# Patient Record
Sex: FEMALE | Race: WHITE | NOT HISPANIC OR LATINO | Employment: FULL TIME | URBAN - METROPOLITAN AREA
[De-identification: names, ages, dates, MRNs, and addresses within clinical notes are randomized per-mention and may not be internally consistent; named-entity substitution may affect disease eponyms.]

---

## 2024-01-12 ENCOUNTER — OFFICE VISIT (OUTPATIENT)
Dept: PRIMARY CARE | Facility: CLINIC | Age: 49
End: 2024-01-12
Payer: COMMERCIAL

## 2024-01-12 VITALS
WEIGHT: 176.6 LBS | OXYGEN SATURATION: 98 % | BODY MASS INDEX: 28.38 KG/M2 | HEIGHT: 66 IN | HEART RATE: 88 BPM | DIASTOLIC BLOOD PRESSURE: 78 MMHG | SYSTOLIC BLOOD PRESSURE: 122 MMHG

## 2024-01-12 DIAGNOSIS — Z00.00 ANNUAL PHYSICAL EXAM: Primary | ICD-10-CM

## 2024-01-12 DIAGNOSIS — Z12.11 SCREEN FOR COLON CANCER: ICD-10-CM

## 2024-01-12 DIAGNOSIS — Z79.899 DRUG THERAPY: ICD-10-CM

## 2024-01-12 DIAGNOSIS — Z12.31 OTHER SCREENING MAMMOGRAM: ICD-10-CM

## 2024-01-12 DIAGNOSIS — F90.9 ATTENTION DEFICIT HYPERACTIVITY DISORDER (ADHD), UNSPECIFIED ADHD TYPE: ICD-10-CM

## 2024-01-12 PROCEDURE — 1036F TOBACCO NON-USER: CPT | Performed by: STUDENT IN AN ORGANIZED HEALTH CARE EDUCATION/TRAINING PROGRAM

## 2024-01-12 PROCEDURE — 80324 DRUG SCREEN AMPHETAMINES 1/2: CPT

## 2024-01-12 PROCEDURE — 93000 ELECTROCARDIOGRAM COMPLETE: CPT | Performed by: STUDENT IN AN ORGANIZED HEALTH CARE EDUCATION/TRAINING PROGRAM

## 2024-01-12 PROCEDURE — 99386 PREV VISIT NEW AGE 40-64: CPT | Performed by: STUDENT IN AN ORGANIZED HEALTH CARE EDUCATION/TRAINING PROGRAM

## 2024-01-12 PROCEDURE — 99214 OFFICE O/P EST MOD 30 MIN: CPT | Performed by: STUDENT IN AN ORGANIZED HEALTH CARE EDUCATION/TRAINING PROGRAM

## 2024-01-12 RX ORDER — SERTRALINE HYDROCHLORIDE 100 MG/1
100 TABLET, FILM COATED ORAL
Qty: 30 TABLET | Refills: 24 | Status: CANCELLED | OUTPATIENT
Start: 2024-01-12 | End: 2026-01-21

## 2024-01-12 RX ORDER — SERTRALINE HYDROCHLORIDE 100 MG/1
100 TABLET, FILM COATED ORAL
COMMUNITY
Start: 2022-03-22 | End: 2024-03-31

## 2024-01-12 RX ORDER — DEXTROAMPHETAMINE SACCHARATE, AMPHETAMINE ASPARTATE MONOHYDRATE, DEXTROAMPHETAMINE SULFATE AND AMPHETAMINE SULFATE 7.5; 7.5; 7.5; 7.5 MG/1; MG/1; MG/1; MG/1
30 CAPSULE, EXTENDED RELEASE ORAL
Qty: 30 CAPSULE | Refills: 13 | Status: CANCELLED | OUTPATIENT
Start: 2024-01-12 | End: 2025-02-10

## 2024-01-12 RX ORDER — DEXTROAMPHETAMINE SACCHARATE, AMPHETAMINE ASPARTATE MONOHYDRATE, DEXTROAMPHETAMINE SULFATE AND AMPHETAMINE SULFATE 7.5; 7.5; 7.5; 7.5 MG/1; MG/1; MG/1; MG/1
30 CAPSULE, EXTENDED RELEASE ORAL
COMMUNITY
Start: 2024-01-09 | End: 2024-01-19 | Stop reason: SDUPTHER

## 2024-01-12 ASSESSMENT — PAIN SCALES - GENERAL: PAINLEVEL: 0-NO PAIN

## 2024-01-12 NOTE — PROGRESS NOTES
Subjective   Patient ID: Ashwini Larose is a 49 y.o. female who presents for Establish Care and Annual Exam (PAP 2021). Moved here from [place] about 2 years ago. Had continued to see PCP in [place] until just recently, has decided to establish care closer to home. Last visit around 8/2023    INTERVAL HX/CURRENT CONCERNS:  Was seen in ED 1/03/24 for pelvic pain. Was diagnosed with fibroids. Saw gynecology earlier this week and had EMB and awaiting results. Will be likely planning for hysterectomy which she is a bit nervous about.     CHRONIC CONDITIONS:  ADHD - states taking Adderall since about 2020. Previously through [place] PCP, Dr. Yolis Avina at [facility]. Diagnosed years ago by there PCP, started on Adderall 2020. Has never seen psychiatry.. Last dose adjustment was about a year ago. Has been doing very well on this current dose. Last rx filled around 11/2023 for 90 pills. Taking 30 mg most days but usually not on weekends.   Anxiety/Depression - sertraline 100mg. Stable mood.    Chart review -  8/11/23- Telemedicine w/ Dr. Donahue in   per notes continue zoloft 100mg, consider increasing dose next visit. Topomax 50mg BID for weight loss. No notes regarding ADHD/adderall  5/19/23 - Telemedicine w/ Dr. Avina. Resumed adderall, temporarily changed to focalin d/t low supply at pharmacy. Dose 20-30mg daily based on symptoms. Buspar for anxiety. Topomax for weight. Gyn referral for dank-menopausal symptoms.     EtOH - occas 2 drinks/week  Drugs - none   Tobacco - former smoker, social in HS, never regular smoker    Stimulants:   What is the patient's goal of therapy? Daily functioning at work and at home  Is this being achieved with current treatment? Yes     Activities of Daily Living:   Is your overall impression that this patient is benefiting (symptom reduction outweighs side effects) from stimulant therapy? Yes     1. Physical Functioning: Better  2. Family Relationship: Better  3. Social Relationship: Better  4.  Mood: Same  5. Sleep Patterns: Same  6. Overall Function: Better    Health Maintenance  Immunizations:     Tdap - not UTD, decline today    Pneumococcal    Shingles     COVID - x2, no plans for updated vaccine    Influenza - declines    Colonoscopy: never. Agreeable to referral. 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 Erika Harvey   LMP 1/01/24  H/o STD denies  Last Pap - unsure, currently under gyn  Last Mammogram - 2020, ordered  Sexual partner - fiance    Current Outpatient Medications   Medication Instructions    amphetamine-dextroamphetamine XR (Adderall XR) 30 mg 24 hr capsule 30 mg, oral, Daily RT    sertraline (ZOLOFT) 100 mg, oral, Daily RT     No Known Allergies      There is no immunization history on file for this patient.  Past Surgical History:   Procedure Laterality Date    APPENDECTOMY      WISDOM TOOTH EXTRACTION       Family History   Problem Relation Name Age of Onset    Cancer Mother      Heart disease Mother      Hypertension Mother      Hypertension Father      Stroke Father      Asthma Brother      Cancer Maternal Grandmother       Social History     Tobacco Use    Smoking status: Former     Types: Cigarettes    Smokeless tobacco: Never   Vaping Use    Vaping Use: Never used   Substance Use Topics    Alcohol use: Yes    Drug use: Never       Review of Systems   Constitutional:  Negative for chills and fever.   HENT:  Negative for trouble swallowing.    Eyes:  Negative for visual disturbance.   Respiratory:  Negative for cough, chest tightness, shortness of breath and wheezing.    Cardiovascular:  Negative for chest pain and palpitations.   Gastrointestinal:  Negative for abdominal pain, blood in stool, constipation and diarrhea.   Genitourinary:  Negative for difficulty urinating, vaginal bleeding, vaginal discharge and vaginal  "pain.   Neurological:  Negative for dizziness, weakness, light-headedness and headaches.       Objective   Visit Vitals  /78   Pulse 88   Ht 1.676 m (5' 6\")   Wt 80.1 kg (176 lb 9.6 oz)   LMP 01/01/2024   SpO2 98%   BMI 28.50 kg/m²   Smoking Status Former   BSA 1.93 m²       Physical Exam  Constitutional:       Appearance: Normal appearance.   HENT:      Head: Normocephalic and atraumatic.      Right Ear: Tympanic membrane, ear canal and external ear normal.      Left Ear: Tympanic membrane, ear canal and external ear normal.      Mouth/Throat:      Mouth: Mucous membranes are moist.      Pharynx: Oropharynx is clear.   Eyes:      Extraocular Movements: Extraocular movements intact.      Conjunctiva/sclera: Conjunctivae normal.      Pupils: Pupils are equal, round, and reactive to light.   Cardiovascular:      Rate and Rhythm: Normal rate and regular rhythm.      Pulses: Normal pulses.      Heart sounds: Normal heart sounds. No murmur heard.  Pulmonary:      Effort: Pulmonary effort is normal.      Breath sounds: Normal breath sounds. No wheezing, rhonchi or rales.   Abdominal:      General: Abdomen is flat.      Palpations: Abdomen is soft.      Tenderness: There is no abdominal tenderness.   Musculoskeletal:         General: Normal range of motion.      Cervical back: Neck supple.   Skin:     General: Skin is warm and dry.   Neurological:      Mental Status: She is alert.   Psychiatric:         Mood and Affect: Mood normal.         Behavior: Behavior normal.           Assessment/Plan   1. Annual physical exam  Normal exam  No acute issues  HM reviewed and discussed  Labs done recently in ED, reviewed.   - ECG 12 Lead    2. Attention deficit hyperactivity disorder (ADHD), unspecified ADHD type  Treated through prior PCP. Discussed history of her diagnosis and treatment. Prior records briefly reviewed Urine collected today for UDS. Last dose of Adderall was today. She will be running out in next several days. " Well controlled on current regimen.  Controlled substance contract reviewed with pt and completed today  Dosing and appropriate use discussed.  Precautions discussed  No red flags identified but  unable to access PDMP during our visit so will need to review before refill her prescription. Also informed I will need to further review her prior records to confirm diagnosis history/testing/treatment history before prescribing any medication. She is agreeable.   Return 3 months  - Amphetamine Confirm, Urine; Future    3. Drug therapy  - Amphetamine Confirm, Urine; Future  - Amphetamine Confirm, Urine; Future  - Amphetamine Confirm, Urine    4. Other screening mammogram  - BI mammo bilateral screening tomosynthesis    5. Screen for colon cancer  - Referral to Gastroenterology; Future

## 2024-01-14 ASSESSMENT — ENCOUNTER SYMPTOMS
LIGHT-HEADEDNESS: 0
PALPITATIONS: 0
CHEST TIGHTNESS: 0
FEVER: 0
SHORTNESS OF BREATH: 0
COUGH: 0
WEAKNESS: 0
HEADACHES: 0
DIARRHEA: 0
WHEEZING: 0
ABDOMINAL PAIN: 0
CONSTIPATION: 0
CHILLS: 0
BLOOD IN STOOL: 0
DIZZINESS: 0
DIFFICULTY URINATING: 0
TROUBLE SWALLOWING: 0

## 2024-01-17 LAB
AMPHET UR-MCNC: >5000 NG/ML
MDA UR-MCNC: <200 NG/ML
MDEA UR-MCNC: <200 NG/ML
MDMA UR-MCNC: <200 NG/ML
METHAMPHET UR-MCNC: <200 NG/ML
PHENTERMINE UR CFM-MCNC: <200 NG/ML

## 2024-01-18 ENCOUNTER — CLINICAL SUPPORT (OUTPATIENT)
Dept: PRIMARY CARE | Facility: CLINIC | Age: 49
End: 2024-01-18
Payer: COMMERCIAL

## 2024-01-18 ENCOUNTER — TELEPHONE (OUTPATIENT)
Dept: PRIMARY CARE | Facility: CLINIC | Age: 49
End: 2024-01-18

## 2024-01-18 DIAGNOSIS — F90.9 ATTENTION DEFICIT HYPERACTIVITY DISORDER (ADHD), UNSPECIFIED ADHD TYPE: Primary | ICD-10-CM

## 2024-01-18 DIAGNOSIS — Z79.899 HISTORY OF LONG-TERM TREATMENT WITH HIGH-RISK MEDICATION: ICD-10-CM

## 2024-01-18 DIAGNOSIS — Z79.899 ENCOUNTER FOR LONG-TERM (CURRENT) USE OF HIGH-RISK MEDICATION: ICD-10-CM

## 2024-01-18 LAB
AMPHETAMINES UR QL SCN>1000 NG/ML: POSITIVE
BARBITURATES UR QL SCN>300 NG/ML: NEGATIVE
BENZODIAZ UR QL SCN>300 NG/ML: NEGATIVE
BZE UR QL SCN>300 NG/ML: NEGATIVE
CANNABINOIDS UR QL SCN>50 NG/ML: NEGATIVE
FENTANYL+NORFENTANYL UR QL SCN: NEGATIVE
METHADONE UR QL SCN>300 NG/ML: NEGATIVE
OPIATES UR QL SCN>300 NG/ML: NEGATIVE
OXYCODONE UR QL: NEGATIVE
PCP UR QL SCN>25 NG/ML: NEGATIVE

## 2024-01-18 PROCEDURE — 80324 DRUG SCREEN AMPHETAMINES 1/2: CPT

## 2024-01-18 PROCEDURE — 80307 DRUG TEST PRSMV CHEM ANLYZR: CPT

## 2024-01-18 NOTE — TELEPHONE ENCOUNTER
Please let her know that I received the results of her urine test from her visit last week and unfortunately the full testing that needed was not completed. It looks like part of the order somehow was cancelled. Will need to have her give us another urine sample at her convenience.     Please arrange for her to come in for nurse visit to give urine sample. Once scheduled I will place order.

## 2024-01-18 NOTE — TELEPHONE ENCOUNTER
Spoke to pt informed of need to come in for urine sample. Voiced understanding. Pt wondering if adderall will be able to be refilled, stating she only has about 3 days worth left, she is aware that [facility] was waiting for documentation from prior provider.

## 2024-01-18 NOTE — TELEPHONE ENCOUNTER
I need the results of the urine test before I can fill the medication. I was able to review the records from her previous provider so just need the results from the urine test. I do not see any diagnostic testing in her chart from what I have reviewed so I am going to put in a referral to psychiatry so that can be done to verify her diagnosis but as we discussed I am willing to fill the medication so her treatment is not interrupted while we wait for her to get in with psychiatry.

## 2024-01-19 RX ORDER — DEXTROAMPHETAMINE SACCHARATE, AMPHETAMINE ASPARTATE, DEXTROAMPHETAMINE SULFATE AND AMPHETAMINE SULFATE 2.5; 2.5; 2.5; 2.5 MG/1; MG/1; MG/1; MG/1
TABLET ORAL
Qty: 90 TABLET | Refills: 0 | Status: SHIPPED | OUTPATIENT
Start: 2024-01-19

## 2024-01-19 NOTE — TELEPHONE ENCOUNTER
Urine drug screen results received and reviewed. Results as expected. Unable to access pt PDMP report through Epic. With assistance from pharmacist Gema Redd was able to pull up report on PDMP website outside of EMR under pt's legal name Talya Larose. No concerning data, prescription history verified. Last filled 11/21/2023 for 90 pills. Report scanned to chart. Prescription sent to pharmacy, will fill prescription until gets in to see psychiatry, referral placed. Prescription sent. To follow up 3 months

## 2024-01-23 LAB
AMPHET UR-MCNC: 2261 NG/ML
MDA UR-MCNC: <200 NG/ML
MDEA UR-MCNC: <200 NG/ML
MDMA UR-MCNC: <200 NG/ML
METHAMPHET UR-MCNC: <200 NG/ML
PHENTERMINE UR CFM-MCNC: <200 NG/ML

## 2024-02-10 ENCOUNTER — HOSPITAL ENCOUNTER (OUTPATIENT)
Dept: RADIOLOGY | Facility: CLINIC | Age: 49
Discharge: HOME | End: 2024-02-10
Payer: COMMERCIAL

## 2024-02-10 VITALS — HEIGHT: 66 IN | BODY MASS INDEX: 26.52 KG/M2 | WEIGHT: 165 LBS

## 2024-02-10 PROCEDURE — 77067 SCR MAMMO BI INCL CAD: CPT

## 2024-02-16 ENCOUNTER — HOSPITAL ENCOUNTER (OUTPATIENT)
Dept: RADIOLOGY | Facility: EXTERNAL LOCATION | Age: 49
Discharge: HOME | End: 2024-02-16

## 2024-05-02 ENCOUNTER — HOSPITAL ENCOUNTER (OUTPATIENT)
Dept: RADIOLOGY | Facility: CLINIC | Age: 49
Discharge: HOME | End: 2024-05-02
Payer: COMMERCIAL

## 2024-05-02 ENCOUNTER — OFFICE VISIT (OUTPATIENT)
Dept: ORTHOPEDIC SURGERY | Facility: CLINIC | Age: 49
End: 2024-05-02
Payer: COMMERCIAL

## 2024-05-02 DIAGNOSIS — R22.32 FINGER MASS, LEFT: ICD-10-CM

## 2024-05-02 DIAGNOSIS — R22.32 FINGER MASS, LEFT: Primary | ICD-10-CM

## 2024-05-02 PROCEDURE — 99204 OFFICE O/P NEW MOD 45 MIN: CPT | Performed by: ORTHOPAEDIC SURGERY

## 2024-05-02 PROCEDURE — 1036F TOBACCO NON-USER: CPT | Performed by: ORTHOPAEDIC SURGERY

## 2024-05-02 PROCEDURE — 73140 X-RAY EXAM OF FINGER(S): CPT | Mod: LEFT SIDE | Performed by: RADIOLOGY

## 2024-05-02 PROCEDURE — 73140 X-RAY EXAM OF FINGER(S): CPT | Mod: LT

## 2024-05-02 PROCEDURE — 99214 OFFICE O/P EST MOD 30 MIN: CPT | Performed by: ORTHOPAEDIC SURGERY

## 2024-05-02 ASSESSMENT — PATIENT HEALTH QUESTIONNAIRE - PHQ9
SUM OF ALL RESPONSES TO PHQ9 QUESTIONS 1 AND 2: 0
1. LITTLE INTEREST OR PLEASURE IN DOING THINGS: NOT AT ALL
2. FEELING DOWN, DEPRESSED OR HOPELESS: NOT AT ALL

## 2024-05-02 ASSESSMENT — ENCOUNTER SYMPTOMS
DEPRESSION: 0
LOSS OF SENSATION IN FEET: 0
OCCASIONAL FEELINGS OF UNSTEADINESS: 0

## 2024-05-02 ASSESSMENT — PAIN - FUNCTIONAL ASSESSMENT: PAIN_FUNCTIONAL_ASSESSMENT: NO/DENIES PAIN

## 2024-05-04 NOTE — H&P (VIEW-ONLY)
History of Present Illness:  Chief Complaint   Patient presents with    Left Hand - Mass     Left ring finger mass       Patient presents today for evaluation of left ring finger mass that has been present for approximately 2 years.  It has been slowly increasing in size and causing nail deformity issues.  No associated pain.  She does not recall any prior injury.    Past Medical History:   Diagnosis Date    ADHD (attention deficit hyperactivity disorder)     Anxiety     Depression     Varicella 1982    Visual impairment 2018       Medication Documentation Review Audit       Reviewed by Isabela Kay CMA (Medical Assistant) on 24 at 1500      Medication Order Taking? Sig Documenting Provider Last Dose Status   amphetamine-dextroamphetamine (Adderall) 10 mg tablet [id number]  Take 2-3 tabs PO daily. 30 day prescription Sheryl Harvey MD  Active   sertraline (Zoloft) 100 mg tablet [id number]  Take 1 tablet (100 mg) by mouth once daily. Historical Provider, MD   24 1628                    No Known Allergies    Social History     Socioeconomic History    Marital status: Significant Other     Spouse name: Not on file    Number of children: Not on file    Years of education: Not on file    Highest education level: Not on file   Occupational History    Not on file   Tobacco Use    Smoking status: Former     Current packs/day: 0.00     Average packs/day: 0.3 packs/day for 3.0 years (0.8 ttl pk-yrs)     Types: Cigarettes     Start date: 1991     Quit date: 1994     Years since quittin.3    Smokeless tobacco: Never   Vaping Use    Vaping status: Never Used   Substance and Sexual Activity    Alcohol use: Yes     Alcohol/week: 3.0 standard drinks of alcohol     Types: 3 Glasses of wine per week    Drug use: Never    Sexual activity: Yes     Partners: Male     Birth control/protection: Condom Male   Other Topics Concern    Not on file   Social History Narrative    Not on file     Social  Determinants of Health     Financial Resource Strain: Not on file   Food Insecurity: No Food Insecurity (12/7/2020)    Received from [facility]    Hunger Vital Sign     Worried About Running Out of Food in the Last Year: Never true     Ran Out of Food in the Last Year: Never true   Transportation Needs: No Transportation Needs (12/7/2020)    Received from [facility]    PRAPARE - Transportation     Lack of Transportation (Medical): No     Lack of Transportation (Non-Medical): No   Physical Activity: Not on file   Stress: Not on file   Social Connections: Not on file   Intimate Partner Violence: Not on file   Housing Stability: Not on file       Past Surgical History:   Procedure Laterality Date    ADENOIDECTOMY  1986    APPENDECTOMY      COSMETIC SURGERY  Ears, 2017    WISDOM TOOTH EXTRACTION          Review of Systems   GENERAL: Negative for malaise, significant weight loss, fever  MUSCULOSKELETAL: see HPI  NEURO:  Negative     Physical Examination  Constitutional: Appears well-developed and well-nourished.  Head: Normocephalic and atraumatic.  Eyes: EOMI grossly  Cardiovascular: Intact distal pulses.   Respiratory: Effort normal. No respiratory distress.  Neurologic: Alert and oriented to person, place, and time.  Skin: Skin is warm and dry.  Hematologic / Lymphatic: No lymphedema, lymphangitis.  Psychiatric: normal mood and affect. Behavior is normal.   Musculoskeletal:  Left ring finger: Only partial nail plate currently present.  There is a fullness extending from approximately the DIP extensor crease to a few millimeters distal to the proximal eponychial fold dorsally.  No associated tenderness.  Area appears to have some transillumination.  No extensor lag.  Sensation grossly intact distally.  Capillary refill less than 2 seconds.    Radiographs: Left ring finger radiographs ordered and available for my review/interpretation: No fracture/dislocation.  Joint spaces  maintained.  Soft tissue fullness dorsally about proximal aspect of distal phalanx.     Assessment:  Patient with left ring finger mass and area typically associated with mucous cysts.  No discoloration or symptoms to suggest glomus tumor.     Plan:  We discussed potential differential diagnoses as well as risks and benefits of continued nonoperative versus operative treatment options.  We did also discuss high likelihood of residual nail issues given the degree of current deformity.    Risks and benefits of continued nonoperative versus operative treatment options were reviewed.  The surgical benefits and the potential complications were reviewed with the patient today, as well as the day surgical setting and the likely postoperative course.  Patient understands that the goal of surgery is prevention of worsening symptoms and potential relief of some symptoms.  Risks discussed included, but were not limited to, residual/recurrent/worsening symptoms, pain, infection, bleeding, stiffness, injury to nerve/blood vessels/tendons, wound healing issues and possible need for reoperation pending pathology results as well as possible recurrence.  I answered the patient's questions and surgical consent reviewed and obtained.  The patient has elected to proceed with surgery and we will schedule it at the patient's convenience.    The patient will followup with us in the operating room and then postoperatively.

## 2024-05-04 NOTE — PROGRESS NOTES
History of Present Illness:  Chief Complaint   Patient presents with    Left Hand - Mass     Left ring finger mass       Patient presents today for evaluation of left ring finger mass that has been present for approximately 2 years.  It has been slowly increasing in size and causing nail deformity issues.  No associated pain.  She does not recall any prior injury.    Past Medical History:   Diagnosis Date    ADHD (attention deficit hyperactivity disorder)     Anxiety     Depression     Varicella 1982    Visual impairment 2018       Medication Documentation Review Audit       Reviewed by Isabela Kay CMA (Medical Assistant) on 24 at 1500      Medication Order Taking? Sig Documenting Provider Last Dose Status   amphetamine-dextroamphetamine (Adderall) 10 mg tablet [id number]  Take 2-3 tabs PO daily. 30 day prescription Sheryl Harvey MD  Active   sertraline (Zoloft) 100 mg tablet [id number]  Take 1 tablet (100 mg) by mouth once daily. Historical Provider, MD   24 [id number]                    No Known Allergies    Social History     Socioeconomic History    Marital status: Significant Other     Spouse name: Not on file    Number of children: Not on file    Years of education: Not on file    Highest education level: Not on file   Occupational History    Not on file   Tobacco Use    Smoking status: Former     Current packs/day: 0.00     Average packs/day: 0.3 packs/day for 3.0 years (0.8 ttl pk-yrs)     Types: Cigarettes     Start date: 1991     Quit date: 1994     Years since quittin.3    Smokeless tobacco: Never   Vaping Use    Vaping status: Never Used   Substance and Sexual Activity    Alcohol use: Yes     Alcohol/week: 3.0 standard drinks of alcohol     Types: 3 Glasses of wine per week    Drug use: Never    Sexual activity: Yes     Partners: Male     Birth control/protection: Condom Male   Other Topics Concern    Not on file   Social History Narrative    Not on file     Social  Determinants of Health     Financial Resource Strain: Not on file   Food Insecurity: No Food Insecurity (12/7/2020)    Received from [facility]    Hunger Vital Sign     Worried About Running Out of Food in the Last Year: Never true     Ran Out of Food in the Last Year: Never true   Transportation Needs: No Transportation Needs (12/7/2020)    Received from [facility]    PRAPARE - Transportation     Lack of Transportation (Medical): No     Lack of Transportation (Non-Medical): No   Physical Activity: Not on file   Stress: Not on file   Social Connections: Not on file   Intimate Partner Violence: Not on file   Housing Stability: Not on file       Past Surgical History:   Procedure Laterality Date    ADENOIDECTOMY  1986    APPENDECTOMY      COSMETIC SURGERY  Ears, 2017    WISDOM TOOTH EXTRACTION          Review of Systems   GENERAL: Negative for malaise, significant weight loss, fever  MUSCULOSKELETAL: see HPI  NEURO:  Negative     Physical Examination  Constitutional: Appears well-developed and well-nourished.  Head: Normocephalic and atraumatic.  Eyes: EOMI grossly  Cardiovascular: Intact distal pulses.   Respiratory: Effort normal. No respiratory distress.  Neurologic: Alert and oriented to person, place, and time.  Skin: Skin is warm and dry.  Hematologic / Lymphatic: No lymphedema, lymphangitis.  Psychiatric: normal mood and affect. Behavior is normal.   Musculoskeletal:  Left ring finger: Only partial nail plate currently present.  There is a fullness extending from approximately the DIP extensor crease to a few millimeters distal to the proximal eponychial fold dorsally.  No associated tenderness.  Area appears to have some transillumination.  No extensor lag.  Sensation grossly intact distally.  Capillary refill less than 2 seconds.    Radiographs: Left ring finger radiographs ordered and available for my review/interpretation: No fracture/dislocation.  Joint spaces  maintained.  Soft tissue fullness dorsally about proximal aspect of distal phalanx.     Assessment:  Patient with left ring finger mass and area typically associated with mucous cysts.  No discoloration or symptoms to suggest glomus tumor.     Plan:  We discussed potential differential diagnoses as well as risks and benefits of continued nonoperative versus operative treatment options.  We did also discuss high likelihood of residual nail issues given the degree of current deformity.    Risks and benefits of continued nonoperative versus operative treatment options were reviewed.  The surgical benefits and the potential complications were reviewed with the patient today, as well as the day surgical setting and the likely postoperative course.  Patient understands that the goal of surgery is prevention of worsening symptoms and potential relief of some symptoms.  Risks discussed included, but were not limited to, residual/recurrent/worsening symptoms, pain, infection, bleeding, stiffness, injury to nerve/blood vessels/tendons, wound healing issues and possible need for reoperation pending pathology results as well as possible recurrence.  I answered the patient's questions and surgical consent reviewed and obtained.  The patient has elected to proceed with surgery and we will schedule it at the patient's convenience.    The patient will followup with us in the operating room and then postoperatively.

## 2024-05-23 NOTE — DISCHARGE INSTRUCTIONS
Dr. Sy Post-Operative Instructions  Hand/Wrist/Elbow    Activity:  Rest on the day of surgery.  Gradually resume your regular diet.    Anesthesia:  Numbing medication may last for 8-24 hours.    Post-Operative Medications:  You may resume your regular medications (including blood thinners).  You have been given a prescription for pain medication as needed.  You may also take over-the-counter anti-inflammatories and/or Tylenol for pain relief.  If you are taking the prescribed pain medication you must limit additional Tylenol (acetaminophen) intake to avoid overdose.    Dressings:  Keep your dressings clean and dry.  You may cover with a plastic bag or cast cover and seal bag to your skin above the bandage for showering.  If your dressing becomes wet or significantly bloodstained call the office at [phone].  Okay to remove outer dressings after 2-3 days and shower/wash hands.  Do not soak wound (I.e. no swimming pool, hot tub or dishes).    Post-Operative Care:  Keep the surgical site elevated above the level of your heart to limit swelling.  If your fingers are not included in the dressing you are encouraged to move your fingers regularly (full fist and full extension).  Regularly ice the surgical site.  You may also apply ice pack above the level of the dressing and this will cool the blood as it travels towards the surgical site.    Call Surgeon/Office at any time for:           Office Number: [phone]  Excessive bleeding  Loss of feeling (The local numbing medicine from surgery typically lasts 8-12 hours.  Nerve blocks can last 18-36 hours.)  Tight dressing: Make sure that you are elevating the operative site appropriately.  If no relief then call the office.                                                                                                        Circulation issues:  If fingers change to white or blue  Concerns/Problems with your surgery

## 2024-05-24 ENCOUNTER — HOSPITAL ENCOUNTER (OUTPATIENT)
Facility: CLINIC | Age: 49
Setting detail: OUTPATIENT SURGERY
Discharge: HOME | End: 2024-05-24
Attending: ORTHOPAEDIC SURGERY | Admitting: ORTHOPAEDIC SURGERY
Payer: COMMERCIAL

## 2024-05-24 VITALS
OXYGEN SATURATION: 100 % | SYSTOLIC BLOOD PRESSURE: 115 MMHG | DIASTOLIC BLOOD PRESSURE: 70 MMHG | RESPIRATION RATE: 16 BRPM | HEIGHT: 66 IN | BODY MASS INDEX: 26.68 KG/M2 | HEART RATE: 69 BPM | TEMPERATURE: 97.3 F | WEIGHT: 166 LBS

## 2024-05-24 DIAGNOSIS — R22.32 FINGER MASS, LEFT: ICD-10-CM

## 2024-05-24 PROCEDURE — 87015 SPECIMEN INFECT AGNT CONCNTJ: CPT | Performed by: ORTHOPAEDIC SURGERY

## 2024-05-24 PROCEDURE — 2500000004 HC RX 250 GENERAL PHARMACY W/ HCPCS (ALT 636 FOR OP/ED): Performed by: ORTHOPAEDIC SURGERY

## 2024-05-24 PROCEDURE — 87205 SMEAR GRAM STAIN: CPT | Performed by: ORTHOPAEDIC SURGERY

## 2024-05-24 PROCEDURE — A4217 STERILE WATER/SALINE, 500 ML: HCPCS | Performed by: ORTHOPAEDIC SURGERY

## 2024-05-24 PROCEDURE — 7100000010 HC PHASE TWO TIME - EACH INCREMENTAL 1 MINUTE: Performed by: ORTHOPAEDIC SURGERY

## 2024-05-24 PROCEDURE — 3600000003 HC OR TIME - INITIAL BASE CHARGE - PROCEDURE LEVEL THREE: Performed by: ORTHOPAEDIC SURGERY

## 2024-05-24 PROCEDURE — 88304 TISSUE EXAM BY PATHOLOGIST: CPT | Mod: TC,SUR | Performed by: ORTHOPAEDIC SURGERY

## 2024-05-24 PROCEDURE — 26116 EXC HAND TUM DEEP < 1.5 CM: CPT | Performed by: ORTHOPAEDIC SURGERY

## 2024-05-24 PROCEDURE — 3600000008 HC OR TIME - EACH INCREMENTAL 1 MINUTE - PROCEDURE LEVEL THREE: Performed by: ORTHOPAEDIC SURGERY

## 2024-05-24 PROCEDURE — 2500000005 HC RX 250 GENERAL PHARMACY W/O HCPCS: Performed by: ORTHOPAEDIC SURGERY

## 2024-05-24 PROCEDURE — 7100000009 HC PHASE TWO TIME - INITIAL BASE CHARGE: Performed by: ORTHOPAEDIC SURGERY

## 2024-05-24 RX ORDER — LIDOCAINE HYDROCHLORIDE AND EPINEPHRINE 10; 10 MG/ML; UG/ML
INJECTION, SOLUTION INFILTRATION; PERINEURAL AS NEEDED
Status: DISCONTINUED | OUTPATIENT
Start: 2024-05-24 | End: 2024-05-24 | Stop reason: HOSPADM

## 2024-05-24 RX ORDER — SODIUM CHLORIDE 0.9 G/100ML
IRRIGANT IRRIGATION AS NEEDED
Status: DISCONTINUED | OUTPATIENT
Start: 2024-05-24 | End: 2024-05-24 | Stop reason: HOSPADM

## 2024-05-24 ASSESSMENT — PAIN - FUNCTIONAL ASSESSMENT: PAIN_FUNCTIONAL_ASSESSMENT: 0-10

## 2024-05-24 ASSESSMENT — PAIN SCALES - GENERAL: PAINLEVEL_OUTOF10: 0 - NO PAIN

## 2024-05-24 NOTE — OP NOTE
Pre-Operative Diagnosis: Left ring finger mass with associated nail deformity  Post-Operative Diagnosis: same  Procedure: Left ring finger mass excision  Surgeon: Kerrie  Assistant: Rachel  Anesthesia: Local  Complications: none  Estimated blood loss: Minimal  Specimen: Left ring finger mass for pathology.  Also tissue for culture  Implants:  Nothing was implanted during the procedure  Findings: See below  Disposition: Good/PACU      Indications: Patient with enlarging left ring finger mass and associated nail deformity.  We discussed risks and benefits of continued nonoperative versus operative treatment options.  After thoroughly reviewing patient wished to proceed with surgical excision/debridement with the understanding there may be persistent nail deformity.  We also discussed potential need for additional intervention pending pathology review.  Expected operative and postoperative courses reviewed and questions addressed.    Operative course: Patient was greeted in the preoperative holding area and the operative site was marked with indelible marker.  After confirming operative site and patient identification a mixture of half-and-half lidocaine and Marcaine with epinephrine was infiltrated about the base of the digit for a digital block using sterile technique.  Patient was brought back to the operating room suite where left upper extremity was prepped and draped in standard sterile fashion and timeout procedure was performed as per standard protocol.  Digital turnicot was applied.  Following this the nail plate was elevated off of the nailbed.  Inspection of the nailbed demonstrated a fullness in the area of the ridging of the overlying nail that had just been removed.  A longitudinal incision was made overlying this area of fullness and a tissue plane was identified around an area of fibrous tissue.  This fibrous tissue was sharply excised and passed off the field for pathology analysis.  Total tissue  size measured approximately 2 x 3 x 2 mm.  A small piece of this tissue was also sent for culture, including fungal/AFB.  Patient does have remote history of fungal infection, but with an approximately 7-year window without any symptoms.  The wound was then copiously irrigated and nailbed was reapproximated with 5-0 chromic in a simple interrupted fashion.  Exofin was applied to the nailbed and dry sterile dressings were applied.  Decision to not answer the DIP joint was made given history of remote infection as well as the fact that there was no stalk identified in the soft tissue mass that would have been suggestive of mucous cyst.    Patient was taken to the recovery for further care.  She will follow-up in 2 weeks for wound check and pathology review.

## 2024-05-26 LAB
BACTERIA SPEC CULT: NORMAL
GRAM STN SPEC: NORMAL
GRAM STN SPEC: NORMAL

## 2024-05-28 LAB
LABORATORY COMMENT REPORT: NORMAL
PATH REPORT.FINAL DX SPEC: NORMAL
PATH REPORT.GROSS SPEC: NORMAL
PATH REPORT.RELEVANT HX SPEC: NORMAL
PATH REPORT.TOTAL CANCER: NORMAL

## 2024-06-06 ENCOUNTER — OFFICE VISIT (OUTPATIENT)
Dept: ORTHOPEDIC SURGERY | Facility: CLINIC | Age: 49
End: 2024-06-06
Payer: COMMERCIAL

## 2024-06-06 DIAGNOSIS — R22.32 FINGER MASS, LEFT: Primary | ICD-10-CM

## 2024-06-06 LAB
ACID FAST STN SPEC: NORMAL
MYCOBACTERIUM SPEC CULT: NORMAL

## 2024-06-06 PROCEDURE — 1036F TOBACCO NON-USER: CPT | Performed by: ORTHOPAEDIC SURGERY

## 2024-06-06 PROCEDURE — 99024 POSTOP FOLLOW-UP VISIT: CPT | Performed by: ORTHOPAEDIC SURGERY

## 2024-06-06 ASSESSMENT — PAIN - FUNCTIONAL ASSESSMENT: PAIN_FUNCTIONAL_ASSESSMENT: NO/DENIES PAIN

## 2024-06-06 NOTE — PROGRESS NOTES
History of Present Illness  Chief Complaint   Patient presents with    Left Hand - Post-op     5/24/24 left ring finger mass excision       No pain.  She feels that some of the preoperative finger swelling has decreased.     Examination  Left ring finger  Nailbed is well approximated.  No gross signs of infection.  Sensation grossly intact distally.  Capillary refill less than 2 seconds.  Slight fullness about proximal eponychial fold     Assessment:  Patient status post left ring finger mass excision.  Cultures without growth     Plan  Continue mobilization and progression of activities.  We discussed expected recovery course as well as likely peak reaction at 4 to 6 weeks postoperatively.  We also discussed expected course of nail growth.  Plan for follow-up in 6 weeks for clinical check.  Pathology was reviewed with patient.  We did discuss potential for recurrence as well as possible mucous cyst.  As previously noted the DIP joint was not explored during the initial procedure given the gross appearance of the mass as well as patient's history of prior infection.

## 2024-06-12 LAB
ACID FAST STN SPEC: NORMAL
MYCOBACTERIUM SPEC CULT: NORMAL

## 2024-06-19 LAB
ACID FAST STN SPEC: NORMAL
MYCOBACTERIUM SPEC CULT: NORMAL

## 2024-06-26 LAB
ACID FAST STN SPEC: NORMAL
MYCOBACTERIUM SPEC CULT: NORMAL

## 2024-07-03 LAB
ACID FAST STN SPEC: NORMAL
MYCOBACTERIUM SPEC CULT: NORMAL

## 2024-07-10 LAB
ACID FAST STN SPEC: NORMAL
MYCOBACTERIUM SPEC CULT: NORMAL

## 2024-07-17 LAB
ACID FAST STN SPEC: NORMAL
MYCOBACTERIUM SPEC CULT: NORMAL

## 2024-07-18 ENCOUNTER — OFFICE VISIT (OUTPATIENT)
Dept: ORTHOPEDIC SURGERY | Facility: CLINIC | Age: 49
End: 2024-07-18
Payer: COMMERCIAL

## 2024-07-18 DIAGNOSIS — R22.32 FINGER MASS, LEFT: Primary | ICD-10-CM

## 2024-07-18 PROCEDURE — 1036F TOBACCO NON-USER: CPT | Performed by: ORTHOPAEDIC SURGERY

## 2024-07-18 PROCEDURE — 99211 OFF/OP EST MAY X REQ PHY/QHP: CPT | Performed by: ORTHOPAEDIC SURGERY

## 2024-07-18 ASSESSMENT — PATIENT HEALTH QUESTIONNAIRE - PHQ9
1. LITTLE INTEREST OR PLEASURE IN DOING THINGS: NOT AT ALL
2. FEELING DOWN, DEPRESSED OR HOPELESS: NOT AT ALL
SUM OF ALL RESPONSES TO PHQ9 QUESTIONS 1 AND 2: 0

## 2024-07-18 ASSESSMENT — PAIN - FUNCTIONAL ASSESSMENT: PAIN_FUNCTIONAL_ASSESSMENT: NO/DENIES PAIN

## 2024-07-18 ASSESSMENT — ENCOUNTER SYMPTOMS
OCCASIONAL FEELINGS OF UNSTEADINESS: 0
DEPRESSION: 0
LOSS OF SENSATION IN FEET: 0

## 2024-07-20 NOTE — PROGRESS NOTES
History of Present Illness  Chief Complaint   Patient presents with    Left Hand - Post-op     5/24/24 left ring finger mass excision       She is somewhat concerned regarding some increased fullness around surgical area     Examination  Left ring finger  Nailbed is well-healed.  New nail appears to be growing out with slight splint distally.  No gross signs of infection.  Sensation grossly intact distally.  Capillary refill less than 2 seconds.  Continued slight fullness about proximal eponychial fold     Assessment:  Patient status post left ring finger mass excision.     Plan  Continue mobilization and progression of activities.  We discussed that some of the fullness at this time is likely secondary to peak reaction.  This is not a guarantee that the previous mass has not become to recur.  We once again reviewed expected nail growth and recovery course.  Follow-up again in about 6 to 8 weeks for clinical check.  She will call sooner if any issues or concerns.

## 2024-08-29 ENCOUNTER — APPOINTMENT (OUTPATIENT)
Dept: ORTHOPEDIC SURGERY | Facility: CLINIC | Age: 49
End: 2024-08-29
Payer: COMMERCIAL

## 2024-09-12 ENCOUNTER — APPOINTMENT (OUTPATIENT)
Dept: ORTHOPEDIC SURGERY | Facility: CLINIC | Age: 49
End: 2024-09-12
Payer: COMMERCIAL

## 2024-10-17 ENCOUNTER — APPOINTMENT (OUTPATIENT)
Dept: ORTHOPEDIC SURGERY | Facility: CLINIC | Age: 49
End: 2024-10-17
Payer: COMMERCIAL

## 2024-11-21 ENCOUNTER — APPOINTMENT (OUTPATIENT)
Dept: OTOLARYNGOLOGY | Facility: CLINIC | Age: 49
End: 2024-11-21
Payer: COMMERCIAL

## 2024-11-21 VITALS — WEIGHT: 170 LBS | BODY MASS INDEX: 27.32 KG/M2 | HEIGHT: 66 IN

## 2024-11-21 DIAGNOSIS — J34.3 HYPERTROPHY OF BOTH INFERIOR NASAL TURBINATES: ICD-10-CM

## 2024-11-21 DIAGNOSIS — J34.2 DEVIATED NASAL SEPTUM: ICD-10-CM

## 2024-11-21 DIAGNOSIS — J34.829 NASAL VALVE COLLAPSE: Primary | ICD-10-CM

## 2024-11-21 PROCEDURE — 31231 NASAL ENDOSCOPY DX: CPT | Performed by: OTOLARYNGOLOGY

## 2024-11-21 PROCEDURE — 99203 OFFICE O/P NEW LOW 30 MIN: CPT | Performed by: OTOLARYNGOLOGY

## 2024-11-21 PROCEDURE — 3008F BODY MASS INDEX DOCD: CPT | Performed by: OTOLARYNGOLOGY

## 2024-11-21 PROCEDURE — 1036F TOBACCO NON-USER: CPT | Performed by: OTOLARYNGOLOGY

## 2024-11-21 NOTE — PROGRESS NOTES
"Chief Complaint   Patient presents with    Sinus Problem     NP- SINUS ISSUES, DNS AND DRAINAGE/CONGESTION     HPI:  Talya Larose is a 49 y.o. female who complains of significant difficulty breathing through the right side of her nose.  Has been worse over the past 2 to 3 years.  During this time she is also been getting some sinus infections which she did not get prior.  Maybe about 2 a year.  She tried numerous sprays in the past without any significant help.    PMH:  Past Medical History:   Diagnosis Date    ADHD (attention deficit hyperactivity disorder)     Anxiety     Depression     Varicella 1982    Visual impairment 2018     Past Surgical History:   Procedure Laterality Date    ADENOIDECTOMY  1986    APPENDECTOMY      COSMETIC SURGERY  Ears, 2017    WISDOM TOOTH EXTRACTION           Medications:     Current Outpatient Medications:     amphetamine-dextroamphetamine (Adderall) 10 mg tablet, Take 2-3 tabs PO daily. 30 day prescription, Disp: 90 tablet, Rfl: 0    sertraline (Zoloft) 100 mg tablet, Take 1 tablet (100 mg) by mouth once daily., Disp: , Rfl:      Allergies:  No Known Allergies     ROS:  Review of systems normal unless stated otherwise in the HPI and/or PMH.    Physical Exam:  Height 1.676 m (5' 6\"), weight 77.1 kg (170 lb), last menstrual period 01/01/2024. Body mass index is 27.44 kg/m².     GENERAL APPEARANCE: Well developed and well nourished.  Alert and oriented in no acute distress.  Normal vocal quality.      HEAD/FACE: No erythema or edema or facial tenderness.  Normal facial nerve function bilaterally.    EAR:       EXTERNAL: Normal pinnas and external auditory canals without lesion or obstructing wax.       MIDDLE EAR: Tympanic membranes intact and mobile with normal landmarks.  Middle ear space appears well aerated.       TUBE STATUS: N/A       MASTOID CAVITY: N/A       HEARING: Gross hearing assessment is within normal limits.      NOSE:       VISUALIZED USING: Anterior rhinoscopy with " headlight and nasal speculum.       DORSUM: Midline, nontraumatic appearance.  Evidence of some nasal valve collapse with respiration.  She does get significant benefit on the right with modified Christopher maneuver as well as lifting the nasal valve with cerumen loop.       MUCOSA: Normal-appearing.       SECRETIONS: Normal.       SEPTUM: Right inferior spur with some more generalized posterior deviation as well.       INFERIOR TURBINATES: Mild hypertrophy       MIDDLE TURBINATES/MEATUS: Normal       BLEEDING: N/A         ORAL CAVITY/PHARYNX:       TEETH: Adequate dentition.       TONGUE: No mass or lesion.  Normal mobility.       FLOOR OF MOUTH: No mass or lesion.       PALATE: Normal hard palate, soft palate, and uvula.       OROPHARYNX: Normal without mass or lesion.  Tonsils absent       BUCCAL MUCOSA/GBS: Normal without mass or lesion.       LIPS: Normal.    LARYNX/HYPOPHARYNX/NASOPHARYNX: Flexible laryngoscopy was performed after consent secondary to an inadequate mirror examination.  The flexible laryngoscope was placed through the nasal cavity revealing normal nasopharynx, normal oropharynx, normal hypopharynx, and normal larynx.  There is normal bilateral vocal cord mobility without any mucosal masses or lesions.    NECK: No palpable masses or abnormal adenopathy.  Trachea is midline.    THYROID: No thyromegaly or palpable nodule.    SALIVARY GLANDS: Normal bilateral parotid and submandibular glands by inspection and palpation.    TMJ's: Normal.    NEURO: Cranial nerve exam grossly normal bilaterally.       Assessment/Plan   Talya was seen today for sinus problem.  Diagnoses and all orders for this visit:  Nasal valve collapse (Primary)  -     Referral to ENT; Future  Deviated nasal septum  -     Referral to ENT; Future  Hypertrophy of both inferior nasal turbinates  -     Referral to ENT; Future     Educated.  Majority of her problem seems to be related to some external nasal valve collapse.  She is also  has some deviated septum and mild turbinate hypertrophy.  She is failed medical therapy and is interested in surgical therapy.  We did discuss  grafts versus Latera as well as septoplasty and bilateral inferior turbinate reduction.  I do not do any  grafts and she is interested in gaining all options and educating herself therefore I will refer her to [place] [facility] [place].  Follow up if symptoms worsen or fail to improve.     Bryce Moreno MD

## 2024-12-12 ENCOUNTER — APPOINTMENT (OUTPATIENT)
Dept: ORTHOPEDIC SURGERY | Facility: CLINIC | Age: 49
End: 2024-12-12
Payer: COMMERCIAL

## (undated) DEVICE — DRESSING, NON-ADHERENT, TELFA, OUCHLESS, 3 X 8 IN, STERILE

## (undated) DEVICE — Device

## (undated) DEVICE — STOCKINETTE, TUBE, BLN, ST, 1 PLY, 4 X 48 IN, LF

## (undated) DEVICE — DRESSING, GAUZE, PETROLATUM, PATCH, XEROFORM, 1 X 8 IN, STERILE

## (undated) DEVICE — HOLSTER, BOVIE, ES PENCIL, DISP

## (undated) DEVICE — SUTURE, ETHILON, 4-0, BLK, MONO, PS-2 18

## (undated) DEVICE — PREP TRAY, SKIN, DRY, W/GLOVES

## (undated) DEVICE — SOLUTION, IRRIGATION, SODIUM CHLORIDE 0.9%, 1000 ML, POUR BOTTLE

## (undated) DEVICE — BANDAGE, ELASTIC, MATRIX, SELF-CLOSURE, 3 IN X 5 YD, LF